# Patient Record
Sex: FEMALE | Race: WHITE | NOT HISPANIC OR LATINO | URBAN - METROPOLITAN AREA
[De-identification: names, ages, dates, MRNs, and addresses within clinical notes are randomized per-mention and may not be internally consistent; named-entity substitution may affect disease eponyms.]

---

## 2020-01-07 ENCOUNTER — OFFICE (OUTPATIENT)
Dept: URBAN - METROPOLITAN AREA CLINIC 101 | Facility: CLINIC | Age: 65
End: 2020-01-07

## 2020-01-07 VITALS
DIASTOLIC BLOOD PRESSURE: 83 MMHG | HEART RATE: 67 BPM | WEIGHT: 131 LBS | TEMPERATURE: 98.1 F | SYSTOLIC BLOOD PRESSURE: 121 MMHG | HEIGHT: 66 IN

## 2020-01-07 DIAGNOSIS — R10.12 LEFT UPPER QUADRANT PAIN: ICD-10-CM

## 2020-01-07 DIAGNOSIS — Z12.11 ENCOUNTER FOR SCREENING FOR MALIGNANT NEOPLASM OF COLON: ICD-10-CM

## 2020-01-07 DIAGNOSIS — K76.89 OTHER SPECIFIED DISEASES OF LIVER: ICD-10-CM

## 2020-01-07 PROCEDURE — 99203 OFFICE O/P NEW LOW 30 MIN: CPT

## 2020-01-07 NOTE — SERVICEHPINOTES
YOUSIF EHREDIA   is a   64   year old    female who is being seen in consultation at the request of   TD HERNANDEZ   for liver cysts found incidentally on US. States she was having LUQ pain ongoing for years, has remained stable since onset. She reports LUQ pain/"cramping" only at night when lying down. PCP then ordered abd US on 12/5 which showed several hypoechoic lesions within the liver, largest measuring 1.4cm otherwise normal with no biliary dilation. BRDenies any issues with reflux or heartburn--within the past week or 2 has been belching but has also been eating more cabbage. Denies n/v or dysphagia. No right sided abd pain. She denies regular NSAIDs. BMs are daily usually BSS type 4. No rectal bleeding or dark stool. Colonoscopy in 2007 which was reportedly normal. No family hx of colon cancer or polyps. She actually has cologuard kit at home to complete.